# Patient Record
(demographics unavailable — no encounter records)

---

## 2019-01-01 NOTE — PCM.SN
- Free Text/Narrative


Note: 





Ankyloglossia Frenotomy Note:


Date of procedure: 2019


Performed by: Hernán EARLY and Juliocesar Chávez MD





Details: Consent obtained after explaining risks and benefits. Baby placed 

under radiant warmer. Mouth positioned open by JS and then I used [place] scissors 

and the grooved director to position the tongue before making the frenotomy 

underneath the base of the tongue. Additional piece of connective tissue closer 

to the base of the mouth therefore repeated the procedure again with care not 

to disrupt the tongue, mouth, or glandular tissue. Blood loss less than 1 mL. 

Baby tolerated the procedure well. Observed in the nursery to monitor for 

additional blood loss, parents were updated.

## 2019-01-01 NOTE — PCM.NBDC
Discharge Summary





- Hospital Course


Free Text/Narrative: 





Matthias Nolasco is an early term, AGA male infant currently on day of life 3. 

After delivery he was transferred to the  nursery for vital sign 

monitoring and hepatitis B vaccine/vitamin K/erythromycin eye ointment 

administration. Transition period went smoothly, and the baby was subsequently 

rejoined with his mother. The remainder of the babys hospitalization was 

uncomplicated - ankyloglossia frenotomy on day of life 2, successful 

circumcision on day of life 3. Working on breastfeeding, taking formula well. 

Voiding and stooling appropriately. 





- Discharge Data


Date of Birth: 01/10/19


Delivery Time: 23:05


Discharge Disposition: Home, Self-Care 01


Condition: Good





- Discharge Diagnosis/Problem(s)


(1) Liveborn infant by vaginal delivery


SNOMED Code(s): 354707603, 217353278


   ICD Code: Z38.00 - SINGLE LIVEBORN INFANT, DELIVERED VAGINALLY   Status: 

Acute   Current Visit: Yes   





(2) 37 or more completed weeks of gestation


SNOMED Code(s): 384010271


   ICD Code: ARA7366 -    Status: Acute   Current Visit: Yes   





(3) Ankyloglossia


SNOMED Code(s): 80431726


   ICD Code: Q38.1 - ANKYLOGLOSSIA   Status: Resolved   Current Visit: Yes   





(4) Caput succedaneum


SNOMED Code(s): 31727309


   ICD Code: P12.81 - CAPUT SUCCEDANEUM   Status: Acute   Current Visit: Yes   





(5) Congenital hydrocele


SNOMED Code(s): 43818915


   ICD Code: P83.5 - CONGENITAL HYDROCELE   Status: Acute   Current Visit: Yes 

  





(6)  circumcision


SNOMED Code(s): 027364242, 235655033, 740178614, 831911686


   ICD Code: SZM2324 -    Status: Acute   Current Visit: Yes   





(7) Meconium stained infant


SNOMED Code(s): 469664403


   ICD Code: P96.83 - MECONIUM STAINING   Status: Acute   Current Visit: Yes   





- Discharge Plan


Instructions:  Keeping Your [facility] Safe and Healthy, Easy-to-Read, Circumcision

, Infant, Care After, Easy-to-Read


Referrals: 


Sophia Zuniga MD [Physician] - 19 2:30 pm





- Discharge Summary/Plan Comment


DC Time >30 min.: No


Discharge Summary/Plan:: 





Baby Richy Nolasco is an early term, AGA male infant born via normal spontaneous 

vaginal delivery to a 26 year old  mother at 37 weeks and 0 days. Pregnancy 

uncomplicated with good prenatal care, normal sonograms, and negative 

serologies (HepB sAg negative, Hep C antibody negative, RPR non-reactive, 

Rubella immune, HIV negative, GC/Chlamydia negative). Delivery complicated by 

meconium staining and need for resuscitation, 1, 5, and 10 minute APGARs of  

and 4, 6, and 7 respectively. Ultimately the baby did well with normal vital 

signs throughout hospitalization, benign physical examination apart from mild 

jaundice. Voiding and stooling as expected, feeding well with an acceptable 3.4

% weight loss to date. Passed congenital heart disease screen and hearing test. 

Bilirubin level 8 at 36 hours - low intermediate risk zone, rate of rise 

acceptable at 0.13 mg/dl/hr. Repeat bili in 48 hours as outpatient. No 

hyperbilirubinemia risk factors given formula feeding. Follow-up planned for  with Dr. Zuniga.





Daryn Chávez MD


Pediatric Hospitalist   





 Discharge Instructions





- Discharge [facility]


OAE Results Left Ear: Pass


OAE Results Right Ear: Pass





 History





-  Admission Detail


Date of Service: 19


Infant Delivery Method: Spontaneous Vaginal Delivery-Single





- Maternal History


Maternal MR Number: [id number]


: 1


Term: 0


: 0


Abortions: 0


Live Births: 0


Mother's Blood Type: A


Mother's Rh: Positive


Maternal STD: Negative


Maternal HIV: Negative


Maternal Group Beta Strep/GBS: Negative


Maternal VDRL: Negative


Prenatal Care Received: Yes


MD Office Called for Prenatal Records: Yes


Labs Drawn if Required: Yes


Pregnancy Complications: Other (See Below) (Group B Strep unknown, treated x 3 

doses)





- Delivery Data


APGAR Total Score 1 Minute: 4


APGAR Total Score 5 Minutes: 6


APGAR Total Score 10 Minutes: 7





 Nursery Info & Exam





- Exam


Exam: See Below





- Vital Signs


Vital Signs: 


 Last Vital Signs











Temp  36.9 C   19 04:30


 


Pulse  119   19 04:30


 


Resp  45   19 04:30


 


BP  53/32 L  19 02:00


 


Pulse Ox      











 Birth Weight: 3.22 kg (42.1%ile)


Current Weight: 3.11 kg (3.4% loss)


Height: 49.53 cm





- Nursery Information


Sex, Infant: Male


Cry Description: Normal Pitch


Gissell Reflex: Normal Response


Suck Reflex: Normal Response


Head Circumference: 35.56 cm


Abdominal Girth: 30.48 cm


Bed Type: Open Crib, Other (See Below)





- Lutz Scoring


Neuro Posture, NB: Froglike


Neuro Square Window: Wrist 30 Degrees


Neuro Arm Recoil: Arm Recoil  Degrees


Neuro Popliteal Angle: Popliteal Angle 100 Degrees


Neuro Scarf Sign: Elbow at Same Side


Neuro Heel to Ear: Knee Bent to 90 Heel Reaches 90 Degrees from Prone


Neuro Maturity Score: 17


Physical Skin: Cracking, Pale Areas, Rare Veins


Physical Lanugo: Bald Areas


Physical Plantar Surface: Creases Anterior 2/3


Physical Breast: Stippled Areola, 1-2 mm Bud


Physical Eye/Ear: Formed and Firm, Instant Recoil


Physical Genitals - Male: Testes Down, Good Rugae


Physical Maturity Score: 17


Maturity Ratin


Lutz Additional Comments: 37 week lutz





- Physical Exam


Physical Findings:: 





Neuro: normal tone, in flexion position, +root/suck/grasp/palmomental/Gissell/

Babinski/gallant reflexes


Head: normocephalic, anterior fontanelle open/soft/flat, +resolving caput


EENT: +red reflex bilaterally; normally positioned, symmetrical ears; patent 

nares; moist mucous membranes, normal tongue movement s/p frenotomy, palate 

intact


Neck: no clefts or cysts, normal range of motion, no torticollis, clavicles 

intact


CV: regular rate, normal rhythm, no murmur, 2+ brachial and femoral pulses 

bilaterally


Lungs: non-labored, clear and equal respirations


Abdomen: soft, non-distended, no mass, bowel sounds present, umbilical cord 

area clean and dry


: normal penis s/p circumcision without bleeding, +bilateral testicles 

palpable in scrotum, +mild hydrocele


Rectum: normal position, patent anus


MSK: normal hip movements without clicks


Back: no sacral dimple


Extremities: full range of motion, normal capillary refill, normal digits on 

hands/feet


Skin: +jaundice, +e. toxicum





[facility] POC Testing





- Congenital Heart Disease Screening


CCHD O2 Saturation, Right Hand: 97


CCHD O2 Saturation, Left Foot: 98


CCHD Screen Result: Pass





- Bilirubin Screening


Delivery Date: 01/10/19


Delivery Time: 23:05





[facility] Discharge Procedures





- Procedures Performed


Circumcision: Consent from mother obtained. Time out performed prior to 

procedure. 1% lidocaine used for penile nerve block, sucrose provided during 

the procedure for additional analgesia. Sterile technique utilized throughout 

procedure. 1.3 cm gomco used to isolate foreskin over the glans, clamp held in 

place for 5 minutes prior to removing the foreskin with a scalpel. Estimated 

blood loss less than 1 mL. Good hemostasis at the end of the procedure. 

Vaseline dressing applied, baby observed in the nursery afterwards to monitor 

for bleeding. Parents updated.

## 2019-01-01 NOTE — PCM.NBADM
History





- [facility] Admission Detail


Date of Service: 19


Infant Delivery Method: Spontaneous Vaginal Delivery-Single





- Maternal History


Maternal MR Number: [id number]


: 1


Term: 0


: 0


Abortions: 0


Live Births: 0


Mother's Blood Type: A


Mother's Rh: Positive


Maternal STD: Negative


Maternal HIV: Negative


Maternal Group Beta Strep/GBS: Negative


Maternal VDRL: Negative


Prenatal Care Received: Yes


MD Office Called for Prenatal Records: Yes


Labs Drawn if Required: Yes


Pregnancy Complications: Other (See Below) (Group B Strep unknown, treated x 3 

doses)





- Delivery Data


APGAR Total Score 1 Minute: 4


APGAR Total Score 5 Minutes: 6


APGAR Total Score 10 Minutes: 7





 Nursery Information


Gestation Age (Weeks,Days): Weeks (37), Days (0)


Sex, Infant: Male


Weight: 3.22 kg (42.1%ile)


Length: 49.53 cm


Cry Description: Normal Pitch


Gissell Reflex: Normal Response


Suck Reflex: Normal Response


Head Circumference: 34.93 cm


Abdominal Girth: 30.48 cm


Bed Type: Open Crib, Other (See Below)





 Physician Exam





- Exam


Exam: See Below


Activity: Sleeping


Resting Posture: Flexion


Head: Face Symmetrical, Normocephalic, Bruising, Caput Succedaneum


Eyes: Bilateral: Normal Inspection, Red Reflex, Positive


Ears: Normal Appearance, Symmetrical


Nose: Normal Inspection, Normal Mucosa


Mouth: Nnormal Inspection, Palate Intact, Other (+ankyloglossia)


Neck: Normal Inspection, Supple, Trachea Midline


Chest/Cardiovascular: Normal Appearance, Normal Peripheral Pulses, Regular 

Heart Rate, Symmetrical, Clavicles Intact.  No: Murmur


Respiratory: Lungs Clear, Normal Breath Sounds, No Respiratoy Distress


Abdomen/GI: Normal Bowel Sounds, No Mass, Symmetrical, Soft


Rectal: Normal Exam


Genitalia (Male): Normal Inspection, Other (+mild hydrocele).  No: Undescended 

Testes, Left, Undescended Testes, Right


Spine/Skeletal: Normal Inspection, Normal Range of Motion.  No: Hip Click, Left

, Hip Click, Right, Sacral Sinus


Extremities: Normal Capillary Refill, Normal Range of Motion.  No: Normal 

Inspection


Skin: Dry, Intact, Normal Color, Warm





 Assessment and Plan


(1) Liveborn infant by vaginal delivery


SNOMED Code(s): 903618409, 720704864


   Code(s): Z38.00 - SINGLE LIVEBORN INFANT, DELIVERED VAGINALLY   Status: 

Acute   Current Visit: Yes   





(2) 37 or more completed weeks of gestation


SNOMED Code(s): 621151900


   Code(s): GRW8901 -    Status: Acute   Current Visit: Yes   





(3) Ankyloglossia


SNOMED Code(s): 74175859


   Code(s): Q38.1 - ANKYLOGLOSSIA   Status: Acute   Current Visit: Yes   





(4) Caput succedaneum


SNOMED Code(s): 73290508


   Code(s): P12.81 - CAPUT SUCCEDANEUM   Status: Acute   Current Visit: Yes   





(5) Congenital hydrocele


SNOMED Code(s): 69651585


   Code(s): P83.5 - CONGENITAL HYDROCELE   Status: Acute   Current Visit: Yes   


Problem List Initiated/Reviewed/Updated: Yes


Orders (Last 24 Hours): 


 Active Orders 24 hr











 Category Date Time Status


 


 Patient Status [ADT] Routine ADT  01/10/19 23:35 Active


 


  Hearing Screen [RC] ROUTINE Care  19 00:08 Active


 


  Intake and Output [RC] QSHIFT Care  19 00:08 Active


 


 Notify Provider [RC] PRN Care  19 00:08 Active


 


 Oxygen Therapy [RC] ASDIRECTED Care  19 00:08 Active


 


 Vital Measures,  [RC] Per Unit Routine Care  19 00:08 Active


 


 BILIRUBIN,  PROFILE [CHEM] Routine Lab  19 23:05 Ordered


 


  SCREENING (STATE) [POC] Routine Lab  19 00:08 Ordered


 


 Bacitracin/Neomycin/Polymyxin [Triple Antibiotic Oint] Med  19 00:08 

Active





 See Dose Instructions  TOP ASDIRECTED PRN   


 


 Erythromycin Base [Erythromycin 0.5% Ophth Oint] Med  19 00:08 Active





 1 gm EYEBOTH ONETIME PRN   


 


 Lidocaine 1% [Xylocaine-MPF 1%] Med  19 00:08 Active





 See Dose Instructions  INJECT ONETIME PRN   


 


 Phytonadione [AquaMephyton] Med  19 00:08 Active





 1 mg IM ONETIME PRN   


 


 Sucrose [Sweet-Ease Natural] Med  19 00:08 Active





 2 ml PO ASDIRECTED PRN   


 


 Resuscitation Status Routine Resus Stat  19 00:08 Ordered








 Medication Orders





Erythromycin (Erythromycin 0.5% Ophth Oint)  1 gm EYEBOTH ONETIME PRN


   PRN Reason: For Delivery


   Last Admin: 19 01:37  Dose: 1 tube


Lidocaine HCl (Xylocaine-Mpf 1%)  0 ml INJECT ONETIME PRN


   PRN Reason: Circumcision


Neomycin/Polymyxin/Bacitracin (Triple Antibiotic Oint)  0 gm TOP ASDIRECTED PRN


   PRN Reason: circumcision


Phytonadione (Aquamephyton)  1 mg IM ONETIME PRN


   PRN Reason: For Delivery


   Last Admin: 19 01:38  Dose: 1 mg


Sucrose (Sweet-Ease Natural)  2 ml PO ASDIRECTED PRN


   PRN Reason: Circimcision








Plan: 





Baby Richy Nolasco is an early term, AGA (42.1%ile by WHO) healthy boy delivered 

via  to a 25 yo  mother at 37 weeks and 0 days. Pregnancy uncomplicated 

with good prenatal care, normal sonograms, and negative serologies (HepB sAg 

negative, Hep C antibody negative, RPR non-reactive, Rubella immune, HIV 

negative, GC/Chlamydia negative). 3rd trimester group B strep unknown, adequate 

IAP x 3 doses, less than 18-hour long rupture of membranes. No ABO/Rh 

incompatibility. Delivery complicated by meconium staining and need for 

resuscitation, 1-, 5-, and 10-minute APGAR scores of 4, 6, and 7. Planning for 

routine  care.





Daryn Chávez MD


Pediatric Hospitalist

## 2019-01-01 NOTE — PCM.SN
- Free Text/Narrative


Note: 





Date of delivery: 2019


Date of service: 2019





I was called to examine the baby of Ms. Nolasco, a 26 year old  mother at 

37 weeks, shortly after delivery. Maternal records reviewed with good prenatal 

care, normal sonograms, and negative serologies. 





Baby with meconium staining during delivery and needed to receive 

resuscitation. See nursing notes for full details but ultimately the baby did 

not receive PPV, and with blow-by oxygen had improved saturations. Noted to 

have significantly increased work of breathing shortly after delivery with 

retractions. However by the time I arrived these had resolved and there was 

only minimal nasal flaring. I examined the baby and then the baby was brought 

to his mother for breastfeeding. Remainder of transition period uneventful.





Daryn Chávez MD


Pediatric Hospitalist